# Patient Record
(demographics unavailable — no encounter records)

---

## 2024-12-25 NOTE — HISTORY OF PRESENT ILLNESS
[FreeTextEntry1] : 58M with PMH of T2DM, HTN and HLD here for CV eval of chest pain. CP appear to be atypical and with less exertional component.   12/20 f/u- bp much better on new meds, here for TTE/EST and bp f/u.   Cardiac medications= metformin 1000 mg BID, losartan 25 mg QD, toprol XL 50 mg QD, simvastatin 20 mg QHS  EKG= NSR w/o significant ST-T changes  ECHO= normal EF, mild AI Stress test= 10 min 11 sec, 13.4 METS, mild nonspecific changes, mild chest discomfort  LHC= none  Recent hospitalization/ER visit= none No significant family history No significant social history  Assessment and Plan 1. Atypical CP   2. HTN, diastolic hypertensive predominant   3. T2DM   -TTE ECHO w/ normal EF mild AI, EST pt w/ nonspecific changes w/ sxs of chest discomfort, rec CCTA to further work up.  -cont losartan 25 mg QD -cont Toprol XL 50 mg QD  -cont simvastatin 20 mg QHS  -T2DM management w/ PCP   During non face-to-face time, I reviewed relevant portions of the patient's medical record. During face-to-face time, I took a relevant history and examined the patient. I also explained differential diagnoses, relevant cardiac diagnoses, workup, and management plan, which required a moderate level of medical decision making. I answered all questions related to the patient's medical conditions.   Ilsa Santoyo MD, FACC. Attending Interventional Cardiologist [facility]

## 2025-03-01 NOTE — HISTORY OF PRESENT ILLNESS
[FreeTextEntry1] : 58M with PMH of T2DM, HTN and HLD here for CV eval of chest pain.   3/1 f/u- reviewed CCTA, severe proximal LAD disease, rec LHC, pt wants to think about it.  12/20 f/u- bp much better on new meds, here for TTE/EST and bp f/u.   Cardiac medications= metformin 1000 mg BID, losartan 25 mg QD, toprol XL 50 mg QD, simvastatin 20 mg QHS, ASA 81 mg QD   EKG= NSR w/o significant ST-T changes  ECHO= normal EF, mild AI Stress test= 10 min 11 sec, 13.4 METS, mild nonspecific changes, mild chest discomfort  LHC= none  Recent hospitalization/ER visit= none No significant family history No significant social history  Assessment and Plan 1. Atypical CP   2. HTN, diastolic hypertensive predominant   3. T2DM   -CCTA with calcium score of 361 and severe proximal LAD disease, I recommended pt to have LHC done. Pt want to think it over and get back to me.  -TTE ECHO w/ normal EF mild AI, EST pt w/ nonspecific changes w/ sxs of chest discomfort, rec CCTA to further work up.  -cont losartan 25 mg QD -cont Toprol XL 50 mg QD  -cont simvastatin 20 mg QHS  -T2DM management w/ PCP   During non face-to-face time, I reviewed relevant portions of the patient's medical record. During face-to-face time, I took a relevant history and examined the patient. I also explained differential diagnoses, relevant cardiac diagnoses, workup, and management plan, which required a moderate level of medical decision making. I answered all questions related to the patient's medical conditions.   Ilsa Santoyo MD, FACC. Attending Interventional Cardiologist [facility]

## 2025-03-15 NOTE — HISTORY OF PRESENT ILLNESS
[FreeTextEntry1] : 58M with PMH of T2DM, HTN and HLD here for CV eval of chest pain.   3/15 f/u- pt agree w/ proeeding with LHC  3/1 f/u- reviewed CCTA, severe proximal LAD disease, rec LHC, pt wants to think about it.  12/20 f/u- bp much better on new meds, here for TTE/EST and bp f/u.   Cardiac medications= metformin 1000 mg BID, losartan 25 mg QD, toprol XL 50 mg QD, simvastatin 20 mg QHS, ASA 81 mg QD   EKG= NSR w/o significant ST-T changes  ECHO= normal EF, mild AI Stress test= 10 min 11 sec, 13.4 METS, mild nonspecific changes, mild chest discomfort  LHC= none  Recent hospitalization/ER visit= none No significant family history No significant social history  Assessment and Plan 1. Atypical CP   2. HTN, diastolic hypertensive predominant   3. T2DM   -CCTA with calcium score of 361 and severe proximal LAD disease, I recommended pt to have LHC done. Pt ok to proceed  -TTE ECHO w/ normal EF mild AI, EST pt w/ nonspecific changes w/ sxs of chest discomfort, rec CCTA to further work up.  -cont losartan 25 mg QD (not taking consistently)  -cont Toprol XL 50 mg QD  -cont simvastatin 20 mg QHS  -T2DM management w/ PCP   Ilsa Santoyo MD FACSaint John's Aurora Community HospitalAI Interventional Cardiology Attending [facility] Tel: [phone] Mobile: [phone] - [place] Go: [email or web address]